# Patient Record
Sex: MALE | Race: WHITE | NOT HISPANIC OR LATINO | Employment: FULL TIME | URBAN - METROPOLITAN AREA
[De-identification: names, ages, dates, MRNs, and addresses within clinical notes are randomized per-mention and may not be internally consistent; named-entity substitution may affect disease eponyms.]

---

## 2017-10-12 ENCOUNTER — TELEPHONE (OUTPATIENT)
Dept: INTERNAL MEDICINE | Facility: CLINIC | Age: 47
End: 2017-10-12

## 2017-10-12 ENCOUNTER — HOSPITAL ENCOUNTER (OUTPATIENT)
Dept: RADIOLOGY | Facility: HOSPITAL | Age: 47
Discharge: HOME | End: 2017-10-12
Attending: INTERNAL MEDICINE
Payer: COMMERCIAL

## 2017-10-12 ENCOUNTER — OFFICE VISIT (OUTPATIENT)
Dept: INTERNAL MEDICINE | Facility: CLINIC | Age: 47
End: 2017-10-12
Payer: COMMERCIAL

## 2017-10-12 VITALS
WEIGHT: 178.38 LBS | DIASTOLIC BLOOD PRESSURE: 84 MMHG | TEMPERATURE: 98 F | BODY MASS INDEX: 24.97 KG/M2 | HEART RATE: 76 BPM | SYSTOLIC BLOOD PRESSURE: 124 MMHG | RESPIRATION RATE: 18 BRPM | HEIGHT: 71 IN

## 2017-10-12 DIAGNOSIS — R06.02 SOB (SHORTNESS OF BREATH): ICD-10-CM

## 2017-10-12 DIAGNOSIS — R06.02 SOB (SHORTNESS OF BREATH): Primary | ICD-10-CM

## 2017-10-12 PROCEDURE — 99204 OFFICE O/P NEW MOD 45 MIN: CPT | Mod: S$GLB,,, | Performed by: INTERNAL MEDICINE

## 2017-10-12 PROCEDURE — 99999 PR PBB SHADOW E&M-NEW PATIENT-LVL III: CPT | Mod: PBBFAC,,, | Performed by: INTERNAL MEDICINE

## 2017-10-12 PROCEDURE — 71020 XR CHEST PA AND LATERAL: CPT | Mod: TC,PO

## 2017-10-12 PROCEDURE — 71020 XR CHEST PA AND LATERAL: CPT | Mod: 26,,, | Performed by: RADIOLOGY

## 2017-10-12 RX ORDER — FINASTERIDE 1 MG/1
1 TABLET, FILM COATED ORAL DAILY
Qty: 30 TABLET | Refills: 0
Start: 2017-10-12 | End: 2017-11-11

## 2017-10-12 RX ORDER — FINASTERIDE 5 MG/1
TABLET, FILM COATED ORAL
Refills: 0 | COMMUNITY
Start: 2017-08-11 | End: 2017-10-12 | Stop reason: CLARIF

## 2017-10-12 RX ORDER — MINERAL OIL
180 ENEMA (ML) RECTAL DAILY
COMMUNITY

## 2017-10-12 NOTE — PROGRESS NOTES
This office note has been dictated.  HISTORY:  This is a 46-year-old male in good general health, in today to discuss   the chronic, but recently worsening symptom.  The patient states that for   several years, but particularly in the last couple of months, he has had an   intermittent feeling of odd shortness of breath, having to take a deep breath.    It might last for 5-10 minutes or may just last a couple of breaths.  He is not   describing any chest pain, no cough, no hemoptysis.  It is not exertional.  The   patient is active physically and has no issues with breathing, chest pain,   shortness of breath or other while exercising.  There has been no significant   weight loss.  No melena, no hematochezia.  He does not have a known history of   reactive airway disease.  He is not describing any particular increase in sinus   congestion or other.  Denies any obvious significant new stressors or changes in   lifestyle, other than his 8-year-old son is having some anxiety issues and has   been in therapy for a couple of months.    CURRENT MEDICATIONS:  Finasteride 1 mg daily.    REVIEW OF SYSTEMS:  CONSTITUTIONAL:  No fever or chills.  No general body aches.  No weight loss or   unusual weight gain.  HEENT:  No hoarseness.  No dysphagia.  CARDIOVASCULAR:  Denies chest pain, palpitations, syncope, presyncope,   claudication or edema.  No PND.  No orthopnea.  RESPIRATORY:  See HPI.  GASTROINTESTINAL:  No nausea or vomiting.  No abdominal pain, no diarrhea, no   change in bowel habits.  GENITOURINARY:  No dysuria, frequency, or change in color or character of his   urine.  SKIN:  No rashes.  MUSCULOSKELETAL:  No new significant musculoskeletal pains or other.    PAST MEDICAL HISTORY, PAST SURGICAL HISTORY, FAMILY AND SOCIAL HISTORY:  All   noted and reviewed in the electronic medical record history sections.    PHYSICAL EXAMINATION:  GENERAL:  Pleasant, alert, appropriately groomed gentleman in no acute  distress.  VITAL SIGNS:  All noted and reviewed as normal.  EYES:  Sclerae white, nonicteric.  HEENT:  Normocephalic.  NECK:  Supple, no masses, no thyromegaly  LUNGS:  Clear to auscultation, normal to percussion.  CARDIOVASCULAR:  Regular rate and rhythm.  No murmur, no click, no rub, no   gallop.  No JVD.  Carotids are full bilaterally without bruits.  No peripheral   extremity edema.  GASTROINTESTINAL:  The abdomen is soft, benign.  No masses.  No tenderness or   organomegaly.  MENTAL STATUS:  Alert and oriented.  Affect and mood all appropriate.    IMPRESSION:  Intermittent brief ill-defined sensations of shortness of breath.    No clear etiology.  Suspect most likely functional, but we will rule out other   organic causes.    PLAN:  1.  Baseline lab data to include a CBC, chemistry profile, TSH, lipids, and   urinalysis.  2.  Chest x-ray.  3.  Pulmonary function studies with and without bronchodilators.  4.  Phone review data.      CHUY  dd: 10/12/2017 17:50:38 (CDT)  td: 10/13/2017 10:19:44 (CDT)  Doc ID   #[id number]  Job ID #[id number]    CC:

## 2017-10-16 ENCOUNTER — HOSPITAL ENCOUNTER (OUTPATIENT)
Dept: PULMONOLOGY | Facility: CLINIC | Age: 47
Discharge: HOME | End: 2017-10-16
Payer: COMMERCIAL

## 2017-10-16 DIAGNOSIS — R06.02 SOB (SHORTNESS OF BREATH): ICD-10-CM

## 2017-10-16 LAB
PRE FEV1 FVC: 81
PRE FEV1: 4.17
PRE FVC: 5.16
PREDICTED FEV1 FVC: 82
PREDICTED FEV1: 4.2
PREDICTED FVC: 5.12

## 2017-10-16 PROCEDURE — 94010 BREATHING CAPACITY TEST: CPT | Mod: S$GLB,,, | Performed by: INTERNAL MEDICINE

## 2017-10-16 PROCEDURE — 94729 DIFFUSING CAPACITY: CPT | Mod: S$GLB,,, | Performed by: INTERNAL MEDICINE

## 2017-10-17 ENCOUNTER — LAB VISIT (OUTPATIENT)
Dept: LAB | Facility: HOSPITAL | Age: 47
End: 2017-10-17
Attending: INTERNAL MEDICINE
Payer: COMMERCIAL

## 2017-10-17 DIAGNOSIS — R06.02 SOB (SHORTNESS OF BREATH): ICD-10-CM

## 2017-10-17 LAB
ALBUMIN SERPL BCP-MCNC: 3.9 G/DL
ALP SERPL-CCNC: 97 U/L
ALT SERPL W/O P-5'-P-CCNC: 33 U/L
ANION GAP SERPL CALC-SCNC: 14 MMOL/L
AST SERPL-CCNC: 25 U/L
BASOPHILS # BLD AUTO: 0.03 K/UL
BASOPHILS NFR BLD: 0.5 %
BILIRUB SERPL-MCNC: 1.1 MG/DL
BUN SERPL-MCNC: 13 MG/DL
CALCIUM SERPL-MCNC: 9.5 MG/DL
CHLORIDE SERPL-SCNC: 104 MMOL/L
CHOLEST SERPL-MCNC: 180 MG/DL
CHOLEST/HDLC SERPL: 4.6 {RATIO}
CO2 SERPL-SCNC: 24 MMOL/L
CREAT SERPL-MCNC: 1.2 MG/DL
DIFFERENTIAL METHOD: ABNORMAL
EOSINOPHIL # BLD AUTO: 0.2 K/UL
EOSINOPHIL NFR BLD: 3.3 %
ERYTHROCYTE [DISTWIDTH] IN BLOOD BY AUTOMATED COUNT: 11.9 %
EST. GFR  (AFRICAN AMERICAN): >60 ML/MIN/1.73 M^2
EST. GFR  (NON AFRICAN AMERICAN): >60 ML/MIN/1.73 M^2
GLUCOSE SERPL-MCNC: 93 MG/DL
HCT VFR BLD AUTO: 43.7 %
HDLC SERPL-MCNC: 39 MG/DL
HDLC SERPL: 21.7 %
HGB BLD-MCNC: 15.3 G/DL
IMM GRANULOCYTES # BLD AUTO: 0.01 K/UL
IMM GRANULOCYTES NFR BLD AUTO: 0.2 %
LDLC SERPL CALC-MCNC: 98.2 MG/DL
LYMPHOCYTES # BLD AUTO: 2 K/UL
LYMPHOCYTES NFR BLD: 32.8 %
MCH RBC QN AUTO: 31.7 PG
MCHC RBC AUTO-ENTMCNC: 35 G/DL
MCV RBC AUTO: 91 FL
MONOCYTES # BLD AUTO: 0.5 K/UL
MONOCYTES NFR BLD: 8.7 %
NEUTROPHILS # BLD AUTO: 3.3 K/UL
NEUTROPHILS NFR BLD: 54.5 %
NONHDLC SERPL-MCNC: 141 MG/DL
NRBC BLD-RTO: 0 /100 WBC
PLATELET # BLD AUTO: 175 K/UL
PMV BLD AUTO: 10.4 FL
POTASSIUM SERPL-SCNC: 3.9 MMOL/L
PROT SERPL-MCNC: 7.3 G/DL
RBC # BLD AUTO: 4.83 M/UL
SODIUM SERPL-SCNC: 142 MMOL/L
TRIGL SERPL-MCNC: 214 MG/DL
TSH SERPL DL<=0.005 MIU/L-ACNC: 1.25 UIU/ML
WBC # BLD AUTO: 6.12 K/UL

## 2017-10-17 PROCEDURE — 80053 COMPREHEN METABOLIC PANEL: CPT

## 2017-10-17 PROCEDURE — 80061 LIPID PANEL: CPT

## 2017-10-17 PROCEDURE — 85025 COMPLETE CBC W/AUTO DIFF WBC: CPT

## 2017-10-17 PROCEDURE — 36415 COLL VENOUS BLD VENIPUNCTURE: CPT | Mod: PO

## 2017-10-17 PROCEDURE — 84443 ASSAY THYROID STIM HORMONE: CPT

## 2017-10-20 ENCOUNTER — TELEPHONE (OUTPATIENT)
Dept: INTERNAL MEDICINE | Facility: CLINIC | Age: 47
End: 2017-10-20

## 2017-10-20 NOTE — TELEPHONE ENCOUNTER
----- Message from Brittany Ugarte sent at 10/20/2017 10:29 AM CDT -----  Contact: Self [phone]  Patient would like to get test results.  Name of test (lab, mammo, etc.):  Labs   Date of test:  10/17  Ordering provider: Paulo  Where was the test performed:  [facility]  Comments:

## 2018-01-04 ENCOUNTER — OFFICE VISIT (OUTPATIENT)
Dept: URGENT CARE | Facility: CLINIC | Age: 48
End: 2018-01-04
Payer: COMMERCIAL

## 2018-01-04 VITALS
OXYGEN SATURATION: 98 % | DIASTOLIC BLOOD PRESSURE: 86 MMHG | HEART RATE: 75 BPM | BODY MASS INDEX: 24.5 KG/M2 | HEIGHT: 71 IN | SYSTOLIC BLOOD PRESSURE: 129 MMHG | RESPIRATION RATE: 18 BRPM | TEMPERATURE: 98 F | WEIGHT: 175 LBS

## 2018-01-04 DIAGNOSIS — J20.9 ACUTE PURULENT BRONCHITIS: ICD-10-CM

## 2018-01-04 DIAGNOSIS — Z20.828 EXPOSURE TO THE FLU: Primary | ICD-10-CM

## 2018-01-04 LAB
CTP QC/QA: YES
FLUAV AG NPH QL: NEGATIVE
FLUBV AG NPH QL: NEGATIVE

## 2018-01-04 PROCEDURE — 99213 OFFICE O/P EST LOW 20 MIN: CPT | Mod: 25,S$GLB,, | Performed by: INTERNAL MEDICINE

## 2018-01-04 PROCEDURE — 87804 INFLUENZA ASSAY W/OPTIC: CPT | Mod: QW,S$GLB,, | Performed by: INTERNAL MEDICINE

## 2018-01-04 PROCEDURE — 96372 THER/PROPH/DIAG INJ SC/IM: CPT | Mod: S$GLB,,, | Performed by: INTERNAL MEDICINE

## 2018-01-04 RX ORDER — AZITHROMYCIN 250 MG/1
TABLET, FILM COATED ORAL
Qty: 6 TABLET | Refills: 0 | Status: SHIPPED | OUTPATIENT
Start: 2018-01-04

## 2018-01-04 RX ORDER — BETAMETHASONE SODIUM PHOSPHATE AND BETAMETHASONE ACETATE 3; 3 MG/ML; MG/ML
9 INJECTION, SUSPENSION INTRA-ARTICULAR; INTRALESIONAL; INTRAMUSCULAR; SOFT TISSUE ONCE
Status: COMPLETED | OUTPATIENT
Start: 2018-01-04 | End: 2018-01-04

## 2018-01-04 RX ORDER — FINASTERIDE 1 MG/1
1 TABLET, FILM COATED ORAL DAILY
COMMUNITY

## 2018-01-04 RX ORDER — CODEINE PHOSPHATE AND GUAIFENESIN 10; 100 MG/5ML; MG/5ML
5 SOLUTION ORAL EVERY 4 HOURS PRN
Qty: 118 ML | Refills: 0 | Status: SHIPPED | OUTPATIENT
Start: 2018-01-04 | End: 2018-01-14

## 2018-01-04 RX ADMIN — BETAMETHASONE SODIUM PHOSPHATE AND BETAMETHASONE ACETATE 9 MG: 3; 3 INJECTION, SUSPENSION INTRA-ARTICULAR; INTRALESIONAL; INTRAMUSCULAR; SOFT TISSUE at 11:01

## 2018-01-04 NOTE — PROGRESS NOTES
"Subjective:       Patient ID: Prince Case is a 47 y.o. male.    Vitals:  height is 5' 11" (1.803 m) and weight is 79.4 kg (175 lb). His temperature is 97.8 °F (36.6 °C). His blood pressure is 129/86 and his pulse is 75. His respiration is 18 and oxygen saturation is 98%.     Chief Complaint: Sore Throat    Sore Throat    This is a new problem. The current episode started in the past 7 days. The problem has been gradually worsening. Neither side of throat is experiencing more pain than the other. There has been no fever. The patient is experiencing no pain. Associated symptoms include congestion, coughing, headaches and a hoarse voice. Pertinent negatives include no abdominal pain, ear pain or shortness of breath. Treatments tried: mucinex, allegra. The treatment provided no relief.     Review of Systems   Constitution: Negative for chills, fever and malaise/fatigue.   HENT: Positive for congestion, hoarse voice and sore throat. Negative for ear pain.    Eyes: Negative for discharge and redness.   Cardiovascular: Negative for chest pain, dyspnea on exertion and leg swelling.   Respiratory: Positive for cough. Negative for shortness of breath, sputum production and wheezing.    Musculoskeletal: Negative for myalgias.   Gastrointestinal: Negative for abdominal pain and nausea.   Neurological: Positive for headaches.       Objective:      Physical Exam   Constitutional: He appears well-developed and well-nourished.   HENT:   Head: Normocephalic and atraumatic.   Nose: Mucosal edema present.   Mouth/Throat: Posterior oropharyngeal edema and posterior oropharyngeal erythema present.   Eyes: Conjunctivae and EOM are normal. Pupils are equal, round, and reactive to light.   Neck: Normal range of motion. Neck supple.   Cardiovascular: Normal rate and regular rhythm.    Pulmonary/Chest: Effort normal.   Upper airway congestion with exp wheeze   Vitals reviewed.      Assessment:       1. Exposure to the flu    2. Acute " purulent bronchitis        Plan:         Exposure to the flu  -     POCT Influenza A/B    Acute purulent bronchitis  -     betamethasone acetate-betamethasone sodium phosphate injection 9 mg; Inject 1.5 mLs (9 mg total) into the muscle once.  -     guaifenesin-codeine 100-10 mg/5 ml (CHERATUSSIN AC)  mg/5 mL syrup; Take 5 mLs by mouth every 4 (four) hours as needed for Cough.  Dispense: 118 mL; Refill: 0  -     azithromycin (ZITHROMAX Z-RAPHAEL) 250 MG tablet; Take 2 tablets (500 mg) on  Day 1,  followed by 1 tablet (250 mg) once daily on Days 2 through 5.  Dispense: 6 tablet; Refill: 0

## 2018-05-15 ENCOUNTER — OFFICE VISIT (OUTPATIENT)
Dept: UROLOGY | Facility: CLINIC | Age: 48
End: 2018-05-15
Payer: COMMERCIAL

## 2018-05-15 VITALS
HEART RATE: 68 BPM | HEIGHT: 71 IN | SYSTOLIC BLOOD PRESSURE: 125 MMHG | WEIGHT: 180.56 LBS | DIASTOLIC BLOOD PRESSURE: 76 MMHG | BODY MASS INDEX: 25.28 KG/M2

## 2018-05-15 DIAGNOSIS — Z90.79 HISTORY OF REMOVAL OF TESTICLE: ICD-10-CM

## 2018-05-15 DIAGNOSIS — N48.9 PENILE LESION: Primary | ICD-10-CM

## 2018-05-15 PROCEDURE — 99203 OFFICE O/P NEW LOW 30 MIN: CPT | Mod: S$GLB,,, | Performed by: UROLOGY

## 2018-05-15 PROCEDURE — 99999 PR PBB SHADOW E&M-EST. PATIENT-LVL III: CPT | Mod: PBBFAC,,, | Performed by: UROLOGY

## 2018-05-15 PROCEDURE — 3008F BODY MASS INDEX DOCD: CPT | Mod: CPTII,S$GLB,, | Performed by: UROLOGY

## 2018-05-15 NOTE — PROGRESS NOTES
"  Subjective:       Patient ID: Prince Case is a 47 y.o. male.    Chief Complaint:  Other (sore on penis that comes and goes)      History of Present Illness  HPI  Patient is a 47 y.o. male who is new to our clinic and self-referred for evaluation of a penile lesion.   He noticed ~2 weeks ago a spot on the penile shaft.  "looks like a pimple".  They have resolved spontaneously.  No pain.  Slight erythema at the area.  Has had 3 recurrences in 2 weeks.   Has not been on antibiotics for this.   He is sexually active with his wife.   He has a h/o genital herpes---hasn't had an outbreak in years. Does not take suppression.    Patient thinks he had his left testicle removed for undescended testicle as a child but is not sure.       Review of Systems  Review of Systems  All other systems reviewed and negative except pertinent positives noted in HPI.       Objective:     Physical Exam   Nursing note and vitals reviewed.  Constitutional: He is oriented to person, place, and time. Vital signs are normal. He appears well-developed and well-nourished. He is cooperative.   HENT:   Head: Normocephalic.   Neck: No tracheal deviation present.   Cardiovascular: Normal rate and intact distal pulses.    Pulmonary/Chest: Effort normal. No accessory muscle usage. No tachypnea. No respiratory distress.   Abdominal: Soft. Normal appearance. He exhibits no distension, no fluid wave, no ascites and no mass. There is no tenderness. There is no rebound and no CVA tenderness. No hernia. Hernia confirmed negative in the right inguinal area and confirmed negative in the left inguinal area.   Liver/spleen non-palpable.    No obvious inguinal or scrotal incision/scars.    Genitourinary: Penis normal. Right testis shows no mass and no tenderness. Right testis is descended. Circumcised.   Genitourinary Comments: Scrotum showed no rashes or lesions.  Anus/perineum without lesion. LEFT TESTIS IS NON-PALPABLE/ABSENT;  Epididymis showed no masses or " tenderness. No meatal stenosis, meatus in normal location. No penile discharge/plaques. kaleigh deferred       Musculoskeletal: Normal range of motion.   Lymphadenopathy: No inguinal adenopathy noted on the right or left side.        Right: No inguinal adenopathy present.        Left: No inguinal adenopathy present.   Neurological: He is alert and oriented to person, place, and time. He has normal strength.   Skin: No bruising and no rash noted.     Psychiatric: He has a normal mood and affect. His speech is normal and behavior is normal. Thought content normal.       Lab Review  Lab Results   Component Value Date    COLORU Yellow 10/17/2017    SPECGRAV 1.025 10/17/2017    PHUR 5.0 10/17/2017    NITRITE Negative 10/17/2017    KETONESU Negative 10/17/2017    UROBILINOGEN Negative 10/17/2017         Assessment:        1. Penile lesion    2. History of removal of testicle          Plan:     Penile lesion    History of removal of testicle    -penile lesion not concerning.  Likely superficial gland issue.    -pt to confirm his testicle was removed.  Discuss possibility of malignancy if testicle is present but undescended.  Patient will notify me if this is the case.     F/u prn

## 2022-04-28 ENCOUNTER — TELEPHONE (OUTPATIENT)
Dept: UROLOGY | Facility: CLINIC | Age: 52
End: 2022-04-28
Payer: COMMERCIAL

## 2022-04-28 NOTE — TELEPHONE ENCOUNTER
----- Message from Becki Pedraza sent at 4/28/2022  9:16 AM CDT -----  Contact: pt   [phone]  Type:  Sooner Appointment Request    Caller is requesting a sooner appointment.  Caller declined first available appointment listed below.  Caller will not accept being placed on the waitlist and is requesting a message be sent to doctor.    Name of Caller:  Pt  When is the first available appointment?  June  Symptoms:  Sore on genitals //recurring problem  Best Call Back Number:  [phone]    Additional Information:  Pt would like to come in today or tomorrow. Pls call back  and advise

## 2022-04-28 NOTE — TELEPHONE ENCOUNTER
Spoke with patient, offered appointment for tomorrow with Nurse Practitioner, patient declined appointment.    bed rails bed rails bed rails

## 2024-05-29 ENCOUNTER — OFFICE VISIT (OUTPATIENT)
Dept: URGENT CARE | Facility: CLINIC | Age: 54
End: 2024-05-29
Payer: COMMERCIAL

## 2024-05-29 VITALS
HEIGHT: 71 IN | HEART RATE: 78 BPM | SYSTOLIC BLOOD PRESSURE: 117 MMHG | DIASTOLIC BLOOD PRESSURE: 81 MMHG | TEMPERATURE: 97 F | OXYGEN SATURATION: 95 % | BODY MASS INDEX: 25.2 KG/M2 | RESPIRATION RATE: 18 BRPM | WEIGHT: 180 LBS

## 2024-05-29 DIAGNOSIS — H00.015 HORDEOLUM EXTERNUM OF LEFT LOWER EYELID: Primary | ICD-10-CM

## 2024-05-29 PROCEDURE — 99203 OFFICE O/P NEW LOW 30 MIN: CPT | Mod: S$GLB,,,

## 2024-05-29 RX ORDER — ERYTHROMYCIN 5 MG/G
OINTMENT OPHTHALMIC 3 TIMES DAILY
Qty: 3.5 G | Refills: 0 | Status: SHIPPED | OUTPATIENT
Start: 2024-05-29 | End: 2024-06-12

## 2024-05-29 NOTE — PROGRESS NOTES
"Subjective:      Patient ID: Prince Case is a 53 y.o. male.    Vitals:  height is 5' 11" (1.803 m) and weight is 81.6 kg (180 lb). His oral temperature is 97.1 °F (36.2 °C). His blood pressure is 117/81 and his pulse is 78. His respiration is 18 and oxygen saturation is 95%.     Chief Complaint: Eye Problem    This is a 53 y.o. male who presents today with a chief complaint of irritated eye.     Provider note starts below:  Patient presents to clinic for evaluation of left lower eyelid redness, swelling, and pain since yesterday morning.  Patient reports waking up with symptoms.  Tried applying ice pack to the area with minimal improvement.  He denies any known foreign bodies, vision changes, double vision, blurred vision, photophobia, eye redness, eye discharge, eye trauma, headache, fever, chills, confusion, altered mental status, nausea, vomiting.  No other complaints.  Patient does not wear contact lenses.      Eye Problem   The left eye is affected. This is a new problem. The current episode started yesterday. The injury mechanism is unknown. There is No known exposure to pink eye. He Does not wear contacts. Pertinent negatives include no blurred vision, eye discharge, double vision, eye redness, fever, foreign body sensation, itching, nausea, photophobia, recent URI or vomiting. He has tried nothing for the symptoms.       Constitution: Negative for chills and fever.   HENT:  Negative for ear pain, congestion and sore throat.    Neck: Negative for neck pain and neck stiffness.   Cardiovascular:  Negative for chest pain.   Eyes:  Positive for eyelid swelling (L lower). Negative for eye trauma, foreign body in eye, eye discharge, eye itching, eye pain, eye redness, photophobia, vision loss, double vision and blurred vision.        + eyelid redness  + eyelid pain   Respiratory:  Negative for shortness of breath.    Gastrointestinal:  Negative for nausea and vomiting.   Musculoskeletal:  Negative for muscle " cramps and muscle ache.   Skin:  Negative for pale and rash.   Neurological:  Negative for dizziness, light-headedness, headaches and disorientation.   Psychiatric/Behavioral:  Negative for disorientation and confusion.       Objective:     Physical Exam   Constitutional: He is oriented to person, place, and time.  Non-toxic appearance. He does not appear ill. No distress.   HENT:   Head: Normocephalic and atraumatic.   Ears:   Right Ear: Tympanic membrane normal.   Left Ear: Tympanic membrane normal.   Nose: Nose normal.   Mouth/Throat: Mucous membranes are moist. Oropharynx is clear.   Eyes: Conjunctivae are normal. Lids are everted and swept, no foreign bodies found. Right eye exhibits no discharge and no hordeolum. No foreign body present in the right eye. Left eye exhibits hordeolum. Left eye exhibits no discharge. No foreign body present in the left eye.     Extraocular movement intact   Neck: Neck supple.   Cardiovascular: Normal rate, regular rhythm, normal heart sounds and normal pulses.   Pulmonary/Chest: Effort normal and breath sounds normal.   Abdominal: Normal appearance.   Musculoskeletal: Normal range of motion.         General: Normal range of motion.   Neurological: He is alert, oriented to person, place, and time and at baseline.   Skin: Skin is warm and dry.   Psychiatric: His behavior is normal. Mood normal.   Nursing note and vitals reviewed.    Assessment:     1. Hordeolum externum of left lower eyelid        Plan:     Hordeolum externum of left lower eyelid  -     erythromycin (ROMYCIN) ophthalmic ointment; Place into the left eye 3 (three) times daily. for 14 days  Dispense: 3.5 g; Refill: 0            Patient Instructions   Erythromycin ointment: Use q-tip and apply thin layer to stye to eyelid. Use 3 times daily for at least 2 weeks.  Put a warm, wet compress on the stye. Wet a clean washcloth with warm water, and put it over your stye. When the washcloth cools, reheat it with warm water  and put it back over the stye. Repeat these steps for about 10 minutes, and try to do this 3 times a day.  It might help to gently massage your eyelid.  Do not squeeze or try to pop your stye. This can make it worse.  Do not wear eye makeup or contact lenses until your stye is gone.  If not allergic, take Tylenol (Acetaminophen) 325 to 650 mg to  1 g every 4 to 6 hours as needed for fever and/or Motrin (Ibuprofen) 400 to 800 mg every 6 hours as needed for fever as directed for control of pain and/or fever  Use a no tears shampoo and warm water to wash your eyelids with care.  Replace old make up. Throw away mascara, liquid eyeliner, and eye shadow 3 months after first using them.  Remove makeup before sleep each night.  Do not share towels or washcloths.  Wash hands before putting in and taking out contact lenses  If the hordeolum does not reduce in size within two weeks, we advise referral to an ophthalmologist for potential incision and drainage     Please remember that you have received care at an urgent care today. Urgent cares are not emergency rooms and are not equipped to handle life threatening emergencies and cannot rule in or out certain medical conditions and you may be released before all of your medical problems are known or treated. Please arrange follow up with your primary care physician or speciality clinic (ophthalmology)  within 2-5 days if your signs and symptoms have not resolved or worsen. Patient can call our Referral Hotline at [phone] to make an appointment.    Please return here or go to the Emergency Department for any concerns or worsening of condition.Patient was educated on signs/symptoms that would warrant emergent medical attention. Patient verbalized understanding.  Signs of infection. These include a fever of 100.4°F (38°C) or higher; chills.  Swelling and redness around one or both eyes  More eye drainage  Hard to see or move one or both eyes  Pain and discomfort around the  eyes  Increased tears for more than a week

## 2024-05-29 NOTE — PATIENT INSTRUCTIONS
Erythromycin ointment: Use q-tip and apply thin layer to stye to eyelid. Use 3 times daily for at least 2 weeks.  Put a warm, wet compress on the stye. Wet a clean washcloth with warm water, and put it over your stye. When the washcloth cools, reheat it with warm water and put it back over the stye. Repeat these steps for about 10 minutes, and try to do this 3 times a day.  It might help to gently massage your eyelid.  Do not squeeze or try to pop your stye. This can make it worse.  Do not wear eye makeup or contact lenses until your stye is gone.  If not allergic, take Tylenol (Acetaminophen) 325 to 650 mg to  1 g every 4 to 6 hours as needed for fever and/or Motrin (Ibuprofen) 400 to 800 mg every 6 hours as needed for fever as directed for control of pain and/or fever  Use a no tears shampoo and warm water to wash your eyelids with care.  Replace old make up. Throw away mascara, liquid eyeliner, and eye shadow 3 months after first using them.  Remove makeup before sleep each night.  Do not share towels or washcloths.  Wash hands before putting in and taking out contact lenses  If the hordeolum does not reduce in size within two weeks, we advise referral to an ophthalmologist for potential incision and drainage     Please remember that you have received care at an urgent care today. Urgent cares are not emergency rooms and are not equipped to handle life threatening emergencies and cannot rule in or out certain medical conditions and you may be released before all of your medical problems are known or treated. Please arrange follow up with your primary care physician or speciality clinic (ophthalmology)  within 2-5 days if your signs and symptoms have not resolved or worsen. Patient can call our Referral Hotline at [phone] to make an appointment.    Please return here or go to the Emergency Department for any concerns or worsening of condition.Patient was educated on signs/symptoms that would warrant emergent  medical attention. Patient verbalized understanding.  Signs of infection. These include a fever of 100.4°F (38°C) or higher; chills.  Swelling and redness around one or both eyes  More eye drainage  Hard to see or move one or both eyes  Pain and discomfort around the eyes  Increased tears for more than a week